# Patient Record
Sex: MALE | Race: ASIAN
[De-identification: names, ages, dates, MRNs, and addresses within clinical notes are randomized per-mention and may not be internally consistent; named-entity substitution may affect disease eponyms.]

---

## 2020-08-19 ENCOUNTER — APPOINTMENT (OUTPATIENT)
Dept: UROLOGY | Facility: CLINIC | Age: 80
End: 2020-08-19
Payer: MEDICARE

## 2020-08-19 VITALS
TEMPERATURE: 97 F | WEIGHT: 173 LBS | HEIGHT: 66.93 IN | SYSTOLIC BLOOD PRESSURE: 166 MMHG | HEART RATE: 74 BPM | OXYGEN SATURATION: 96 % | BODY MASS INDEX: 27.15 KG/M2 | DIASTOLIC BLOOD PRESSURE: 95 MMHG | RESPIRATION RATE: 18 BRPM

## 2020-08-19 DIAGNOSIS — I10 ESSENTIAL (PRIMARY) HYPERTENSION: ICD-10-CM

## 2020-08-19 DIAGNOSIS — Z87.891 PERSONAL HISTORY OF NICOTINE DEPENDENCE: ICD-10-CM

## 2020-08-19 DIAGNOSIS — E11.9 TYPE 2 DIABETES MELLITUS W/OUT COMPLICATIONS: ICD-10-CM

## 2020-08-19 PROCEDURE — 99203 OFFICE O/P NEW LOW 30 MIN: CPT

## 2020-08-19 RX ORDER — GLIPIZIDE 10 MG/1
10 TABLET ORAL
Refills: 0 | Status: ACTIVE | COMMUNITY

## 2020-08-19 RX ORDER — LOSARTAN POTASSIUM 100 MG/1
100 TABLET, FILM COATED ORAL
Refills: 0 | Status: ACTIVE | COMMUNITY

## 2020-08-19 RX ORDER — ATORVASTATIN CALCIUM 20 MG/1
20 TABLET, FILM COATED ORAL
Refills: 0 | Status: ACTIVE | COMMUNITY

## 2020-08-19 RX ORDER — MELOXICAM 15 MG/1
15 TABLET ORAL
Refills: 0 | Status: ACTIVE | COMMUNITY

## 2020-08-19 RX ORDER — AMLODIPINE BESYLATE 5 MG/1
5 TABLET ORAL
Refills: 0 | Status: ACTIVE | COMMUNITY

## 2020-08-19 RX ORDER — METFORMIN HYDROCHLORIDE 500 MG/1
500 TABLET, COATED ORAL
Refills: 0 | Status: ACTIVE | COMMUNITY

## 2020-08-19 RX ORDER — ASPIRIN 81 MG
81 TABLET, DELAYED RELEASE (ENTERIC COATED) ORAL
Refills: 0 | Status: ACTIVE | COMMUNITY

## 2020-08-19 RX ORDER — SITAGLIPTIN 100 MG/1
100 TABLET, FILM COATED ORAL
Refills: 0 | Status: ACTIVE | COMMUNITY

## 2020-08-19 NOTE — PHYSICAL EXAM
[General Appearance - Well Developed] : well developed [General Appearance - Well Nourished] : well nourished [Normal Appearance] : normal appearance [Well Groomed] : well groomed [General Appearance - In No Acute Distress] : no acute distress [Edema] : no peripheral edema [Respiration, Rhythm And Depth] : normal respiratory rhythm and effort [Exaggerated Use Of Accessory Muscles For Inspiration] : no accessory muscle use [Abdomen Soft] : soft [Abdomen Tenderness] : non-tender [Costovertebral Angle Tenderness] : no ~M costovertebral angle tenderness [Penis Abnormality] : normal circumcised penis [Urethral Meatus] : meatus normal [Urinary Bladder Findings] : the bladder was normal on palpation [Epididymis] : the epididymides were normal [Scrotum] : the scrotum was normal [Testes Tenderness] : no tenderness of the testes [Testes Mass (___cm)] : there were no testicular masses [Prostate Tenderness] : the prostate was not tender [No Prostate Nodules] : no prostate nodules [Normal Station and Gait] : the gait and station were normal for the patient's age [Prostate Size ___ gm] : prostate size [unfilled] gm [] : no rash [No Focal Deficits] : no focal deficits [Oriented To Time, Place, And Person] : oriented to person, place, and time [Affect] : the affect was normal [Mood] : the mood was normal [Not Anxious] : not anxious [No Palpable Adenopathy] : no palpable adenopathy

## 2020-08-19 NOTE — ASSESSMENT
[FreeTextEntry1] : 79 yo M with BPH, LUTS\par \par - PVR = 30ml\par - Natural history of BPH and bladder outlet obstruction reviewed, risks of progression, risks of detrussor myopathy/areflexic bladder, bladder stones, UTI, worsening symptoms, risk of retention and other issues. \par All treatment options were reviewed. This included surveillance, all medical therapeutic options, all outlet procedures including office based, TURP, bipolar TURP, button vaporization, thulium/holmium, suprapubic/retropubic simple (open, robotic) prostatectomy. \par - Continue tamsulosin\par - Given urgency symptoms, discussed pros and cons of adding an anticholinergic. Pt would like to hold off on this at this time.\par - FU ni 3 months\par

## 2020-08-19 NOTE — HISTORY OF PRESENT ILLNESS
[FreeTextEntry1] : 80 [place]inyar[place] speaking M presents with 1 yr history of LUTS\par Nocturia 3/night, voiding during the day every 2 hrs\par Most bothersome is urgency\par no straining, no weak flow\par Worked up by prior urologist (Dr. Springer, Dr. Sanabria) and was told he needed surgery for BPH by Dr. Springer\par no dysuria, no gross hematuria\par no history of UTI, no kidney stones\par Drinks 5 cups of water, 1-2 cups of coffee daily\par normal bowel movements\par Of note, has been on Tamsulosin for the last month and noticed some improvement\par  2 days/day(s)

## 2020-08-24 LAB
APPEARANCE: ABNORMAL
BACTERIA UR CULT: NORMAL
BACTERIA: NEGATIVE
BILIRUBIN URINE: NEGATIVE
BLOOD URINE: NEGATIVE
COLOR: YELLOW
GLUCOSE QUALITATIVE U: NEGATIVE
KETONES URINE: NEGATIVE
LEUKOCYTE ESTERASE URINE: NEGATIVE
MICROSCOPIC-UA: NORMAL
NITRITE URINE: NEGATIVE
PH URINE: 6
PROTEIN URINE: ABNORMAL
RED BLOOD CELLS URINE: 0 /HPF
SPECIFIC GRAVITY URINE: >=1.03
SQUAMOUS EPITHELIAL CELLS: 0 /HPF
URINE COMMENTS: NORMAL
UROBILINOGEN URINE: NORMAL
WHITE BLOOD CELLS URINE: 0 /HPF

## 2020-11-11 ENCOUNTER — APPOINTMENT (OUTPATIENT)
Dept: UROLOGY | Facility: CLINIC | Age: 80
End: 2020-11-11
Payer: MEDICARE

## 2020-11-11 VITALS
WEIGHT: 176 LBS | DIASTOLIC BLOOD PRESSURE: 88 MMHG | HEART RATE: 78 BPM | RESPIRATION RATE: 18 BRPM | TEMPERATURE: 97.2 F | SYSTOLIC BLOOD PRESSURE: 168 MMHG | BODY MASS INDEX: 27.62 KG/M2 | OXYGEN SATURATION: 95 %

## 2020-11-11 PROCEDURE — 99072 ADDL SUPL MATRL&STAF TM PHE: CPT

## 2020-11-11 PROCEDURE — 99213 OFFICE O/P EST LOW 20 MIN: CPT

## 2020-11-14 NOTE — PHYSICAL EXAM
[General Appearance - Well Developed] : well developed [General Appearance - Well Nourished] : well nourished [Normal Appearance] : normal appearance [Well Groomed] : well groomed [General Appearance - In No Acute Distress] : no acute distress [Abdomen Soft] : soft [Abdomen Tenderness] : non-tender [Costovertebral Angle Tenderness] : no ~M costovertebral angle tenderness [Urethral Meatus] : meatus normal [Penis Abnormality] : normal circumcised penis [Urinary Bladder Findings] : the bladder was normal on palpation [Scrotum] : the scrotum was normal [Epididymis] : the epididymides were normal [Testes Tenderness] : no tenderness of the testes [Testes Mass (___cm)] : there were no testicular masses [Prostate Tenderness] : the prostate was not tender [No Prostate Nodules] : no prostate nodules [Prostate Size ___ gm] : prostate size [unfilled] gm [FreeTextEntry1] : deferred today [Edema] : no peripheral edema [] : no respiratory distress [Respiration, Rhythm And Depth] : normal respiratory rhythm and effort [Exaggerated Use Of Accessory Muscles For Inspiration] : no accessory muscle use [Oriented To Time, Place, And Person] : oriented to person, place, and time [Affect] : the affect was normal [Mood] : the mood was normal [Not Anxious] : not anxious [Normal Station and Gait] : the gait and station were normal for the patient's age [No Focal Deficits] : no focal deficits [No Palpable Adenopathy] : no palpable adenopathy

## 2020-11-14 NOTE — HISTORY OF PRESENT ILLNESS
[FreeTextEntry1] : 80 Lao speaking M presents with 1 yr history of LUTS\par Nocturia 3/night, voiding during the day every 2 hrs\par Most bothersome is urgency\par no straining, no weak flow\par Worked up by prior urologist (Dr. Springer, Dr. Sanabria) and was told he needed surgery for BPH by Dr. Springer\par no dysuria, no gross hematuria\par no history of UTI, no kidney stones\par Drinks 5 cups of water, 1-2 cups of coffee daily\par normal bowel movements\par Of note, has been on Tamsulosin for the last month and noticed some improvement\par \par 11/11/20 Interval history:Noticed some improvement in LUTS since last visit\par Nocturia 2/night, straining has improved\par Reduced to 1 cup of coffee\par Continues to take tamsulosin consistently\par

## 2020-11-14 NOTE — ASSESSMENT
[FreeTextEntry1] : 79 yo M with BPH, LUTS\par \par - Reviewed options again for BPH. Decision made to continue tamsulosin. Refill rx transmitted\par - FU in 6 months\par

## 2021-05-12 ENCOUNTER — APPOINTMENT (OUTPATIENT)
Dept: UROLOGY | Facility: CLINIC | Age: 81
End: 2021-05-12
Payer: MEDICARE

## 2021-05-12 VITALS
BODY MASS INDEX: 24.34 KG/M2 | HEART RATE: 79 BPM | WEIGHT: 158.73 LBS | RESPIRATION RATE: 16 BRPM | SYSTOLIC BLOOD PRESSURE: 174 MMHG | HEIGHT: 67.72 IN | TEMPERATURE: 97.5 F | DIASTOLIC BLOOD PRESSURE: 90 MMHG | OXYGEN SATURATION: 93 %

## 2021-05-12 DIAGNOSIS — R39.9 UNSPECIFIED SYMPTOMS AND SIGNS INVOLVING THE GENITOURINARY SYSTEM: ICD-10-CM

## 2021-05-12 DIAGNOSIS — R32 UNSPECIFIED URINARY INCONTINENCE: ICD-10-CM

## 2021-05-12 DIAGNOSIS — N40.1 BENIGN PROSTATIC HYPERPLASIA WITH LOWER URINARY TRACT SYMPMS: ICD-10-CM

## 2021-05-12 DIAGNOSIS — N13.8 BENIGN PROSTATIC HYPERPLASIA WITH LOWER URINARY TRACT SYMPMS: ICD-10-CM

## 2021-05-12 PROCEDURE — 99214 OFFICE O/P EST MOD 30 MIN: CPT

## 2021-05-12 PROCEDURE — 99072 ADDL SUPL MATRL&STAF TM PHE: CPT

## 2021-05-16 PROBLEM — R39.9 LOWER URINARY TRACT SYMPTOMS (LUTS): Status: ACTIVE | Noted: 2020-08-19

## 2021-05-16 PROBLEM — N40.1 BPH WITH OBSTRUCTION/LOWER URINARY TRACT SYMPTOMS: Status: ACTIVE | Noted: 2020-08-19

## 2021-05-16 PROBLEM — R32 INCONTINENCE OF URINE: Status: ACTIVE | Noted: 2021-05-16

## 2021-05-16 NOTE — ASSESSMENT
[FreeTextEntry1] : 81 yo M with BPH, LUTs, incontinence\par \par - PVR = 0ml\par - Reaffirmed behavioral modifications\par - Continue tamsulosin. Rx renewed\par - Pt requesting pull-ups for his post void dribbling. Will submit request\par - FU in 6 months to 1 year or as needed

## 2021-05-16 NOTE — HISTORY OF PRESENT ILLNESS
[FreeTextEntry1] : 80 Yoruba speaking M presents with 1 yr history of LUTS\par Nocturia 3/night, voiding during the day every 2 hrs\par Most bothersome is urgency\par no straining, no weak flow\par Worked up by prior urologist (Dr. Springer, Dr. Sanabria) and was told he needed surgery for BPH by Dr. Springer\par no dysuria, no gross hematuria\par no history of UTI, no kidney stones\par Drinks 5 cups of water, 1-2 cups of coffee daily\par normal bowel movements\par Of note, has been on Tamsulosin for the last month and noticed some improvement\par \par 11/11/20 Interval history:Noticed some improvement in LUTS since last visit\par Nocturia 2/night, straining has improved\par Reduced to 1 cup of coffee\par Continues to take tamsulosin consistently\par \par 5/12/21 Interval history: Continues to do well with tamsulosin\par Nocturia down to 1/night and pt notices better flow\par Does have some post void dribbling and some mild urge incontinence - using about 1 pull-up per day\par Drinking 1 cup of coffee, 5 cups of water daily

## 2021-05-18 RX ORDER — BROMPHENIRAMIN/PSEUDOEPHEDRINE 1-15MG/5ML
LIQUID (ML) ORAL
Qty: 12 | Refills: 11 | Status: ACTIVE | OUTPATIENT
Start: 2021-05-18

## 2021-08-12 ENCOUNTER — APPOINTMENT (OUTPATIENT)
Dept: UROLOGY | Facility: CLINIC | Age: 81
End: 2021-08-12

## 2022-05-18 ENCOUNTER — APPOINTMENT (OUTPATIENT)
Age: 82
End: 2022-05-18

## 2022-08-24 ENCOUNTER — APPOINTMENT (OUTPATIENT)
Age: 82
End: 2022-08-24

## 2023-08-24 ENCOUNTER — RX RENEWAL (OUTPATIENT)
Age: 83
End: 2023-08-24

## 2023-08-24 RX ORDER — TAMSULOSIN HYDROCHLORIDE 0.4 MG/1
0.4 CAPSULE ORAL
Qty: 90 | Refills: 0 | Status: ACTIVE | COMMUNITY
Start: 2020-11-11 | End: 1900-01-01

## 2023-11-16 ENCOUNTER — RX RENEWAL (OUTPATIENT)
Age: 83
End: 2023-11-16